# Patient Record
(demographics unavailable — no encounter records)

---

## 2024-12-09 NOTE — ASSESSMENT
[FreeTextEntry1] : replete vitamin D, increase existing supplement by 1000 IU per day  check labs in 4 weeks, once normalized proceed with Reclast continue with duloxetine for pain management   OV 6 months, sooner PRN

## 2024-12-09 NOTE — PHYSICAL EXAM
[General Appearance - Alert] : alert [General Appearance - In No Acute Distress] : in no acute distress [] : no respiratory distress [Auscultation Breath Sounds / Voice Sounds] : lungs were clear to auscultation bilaterally [Abnormal Walk] : normal gait [Nail Clubbing] : no clubbing  or cyanosis of the fingernails [Musculoskeletal - Swelling] : no joint swelling seen [Motor Tone] : muscle strength and tone were normal [Oriented To Time, Place, And Person] : oriented to person, place, and time

## 2024-12-09 NOTE — HISTORY OF PRESENT ILLNESS
[FreeTextEntry1] : s/p pneumonia 3 weeks ago; she was seen in UC and prescribed Abx.  Now still with shortness of breath on exertion.   FMS--pain is under control except when it is colder Osteoporosis--taking calcium and vitamin D;

## 2024-12-17 NOTE — PROCEDURE
[FreeTextEntry1] : CXR: clear lungs, no focal consolidation or pleural effusions, cardiac silhouette appears normal.  No bony abnormality.  Prior exams reviewed:   PFT: Normal spirometry.  Lung volumes normal. DLCO normal.   ACC: [id number] EXAM: CT ANGIO CHEST PULM ART [facility] ACC: [id number] EXAM: CT NECK SOFT TISSUE  PROCEDURE DATE: 12/19/2022    INTERPRETATION: INDICATIONS: Sensation of foreign body in the throat. Swelling. Acute shortness of breath.  COMPARISON: None available.  PROCEDURE: CT soft tissue neck without IV contrast. CT Angiography of the Chest. Sagittal and coronal reformats were performed as well as 3D (MIP) reconstructions.  FINDINGS: NECK:  AERODIGESTIVE TRACT: No evidence of retained radiopaque foreign body. Mild nonspecific fullness of the lingual and palatine tonsils. There is asymmetric thickening/edema in the left posterior pharyngeal wall (series 3 images 102 through 114 of indeterminate etiology. The supraglottic and glottic airway appears symmetric. Subglottic airway is unremarkable. LYMPH NODES: No adenopathy. PAROTID GLANDS: Unremarkable. SUBMANDIBULAR GLANDS: Unremarkable. THYROID GLAND: Unremarkable. VISUALIZED PARANASAL SINUSES: Clear. VISUALIZED TYMPANOMASTOID CAVITIES: Clear. BONES: Mild degenerative changes. MISCELLANEOUS: Visualized intracranial compartment is unremarkable. Incidental partial retropharyngeal course of the right internal carotid artery.  CHEST:  LUNGS AND AIRWAYS: Patent central airways. No suspicious pulmonary nodule. No evidence of pneumonia. Minimal bibasilar atelectasis. PLEURA: No pleural effusion. No pneumothorax. MEDIASTINUM AND ROBERTO: No lymphadenopathy. VESSELS: Adequate opacification of the pulmonary vasculature. No filling defect present to suggest acute pulmonary embolism. The thoracic aorta is normal in caliber. The main pulmonary arteries dilated to 3.1 cm. HEART: Heart size is normal. No pericardial effusion. CHEST WALL AND LOWER NECK: Within normal limits. VISUALIZED UPPER ABDOMEN: Cholecystectomy. BONES: Within normal limits.   IMPRESSION: Neck: No retained radiopaque foreign body. Indeterminate asymmetric left posterior pharyngeal wall thickening/edema. Follow-up with direct visualization would be of benefit. Further investigation with a contrast-enhanced CT or preferably MRI may also be beneficial in further evaluation.  CTPA: No pulmonary embolism. Mildly dilated main pulmonary artery which can be seen in the setting of pulmonary arterial hypertension. No evidence of pneumonia.  --- End of Report ---      Prior exams reviewed:  PFT: Normal spirometry.  Lung volumes normal. DLCO normal.   CXR: clear lungs, no focal consolidation or pleural effusions, cardiac silhouette appears normal.  No bony abnormality.

## 2024-12-17 NOTE — HISTORY OF PRESENT ILLNESS
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

## 2025-02-04 NOTE — HISTORY OF PRESENT ILLNESS
[Never] : never [TextBox_4] : doing well insurance wont cover qvar anymore, needs to switch to arnuity no complaints

## 2025-02-04 NOTE — ASSESSMENT
[FreeTextEntry1] : change qvar to arnuity once daily bc of insurance coverage cont prn albuterol  fu 3 mo  Total encounter time 30 minutes.

## 2025-02-04 NOTE — PROCEDURE
[FreeTextEntry1] : yoshi: normal  Prior exams reviewed:  CXR: clear lungs, no focal consolidation or pleural effusions, cardiac silhouette appears normal.  No bony abnormality.   PFT: Normal spirometry.  Lung volumes normal. DLCO normal.   ACC: [id number] EXAM: CT ANGIO CHEST PULM ART [facility] ACC: [id number] EXAM: CT NECK SOFT TISSUE  PROCEDURE DATE: 12/19/2022    INTERPRETATION: INDICATIONS: Sensation of foreign body in the throat. Swelling. Acute shortness of breath.  COMPARISON: None available.  PROCEDURE: CT soft tissue neck without IV contrast. CT Angiography of the Chest. Sagittal and coronal reformats were performed as well as 3D (MIP) reconstructions.  FINDINGS: NECK:  AERODIGESTIVE TRACT: No evidence of retained radiopaque foreign body. Mild nonspecific fullness of the lingual and palatine tonsils. There is asymmetric thickening/edema in the left posterior pharyngeal wall (series 3 images 102 through 114 of indeterminate etiology. The supraglottic and glottic airway appears symmetric. Subglottic airway is unremarkable. LYMPH NODES: No adenopathy. PAROTID GLANDS: Unremarkable. SUBMANDIBULAR GLANDS: Unremarkable. THYROID GLAND: Unremarkable. VISUALIZED PARANASAL SINUSES: Clear. VISUALIZED TYMPANOMASTOID CAVITIES: Clear. BONES: Mild degenerative changes. MISCELLANEOUS: Visualized intracranial compartment is unremarkable. Incidental partial retropharyngeal course of the right internal carotid artery.  CHEST:  LUNGS AND AIRWAYS: Patent central airways. No suspicious pulmonary nodule. No evidence of pneumonia. Minimal bibasilar atelectasis. PLEURA: No pleural effusion. No pneumothorax. MEDIASTINUM AND ROBERTO: No lymphadenopathy. VESSELS: Adequate opacification of the pulmonary vasculature. No filling defect present to suggest acute pulmonary embolism. The thoracic aorta is normal in caliber. The main pulmonary arteries dilated to 3.1 cm. HEART: Heart size is normal. No pericardial effusion. CHEST WALL AND LOWER NECK: Within normal limits. VISUALIZED UPPER ABDOMEN: Cholecystectomy. BONES: Within normal limits.   IMPRESSION: Neck: No retained radiopaque foreign body. Indeterminate asymmetric left posterior pharyngeal wall thickening/edema. Follow-up with direct visualization would be of benefit. Further investigation with a contrast-enhanced CT or preferably MRI may also be beneficial in further evaluation.  CTPA: No pulmonary embolism. Mildly dilated main pulmonary artery which can be seen in the setting of pulmonary arterial hypertension. No evidence of pneumonia.  --- End of Report ---      Prior exams reviewed:  PFT: Normal spirometry.  Lung volumes normal. DLCO normal.   CXR: clear lungs, no focal consolidation or pleural effusions, cardiac silhouette appears normal.  No bony abnormality.

## 2025-05-06 NOTE — ASSESSMENT
[FreeTextEntry1] : cont arnuity, singulair  prn albuterol will give medrol pack to have in case symptoms worsen  fu 3 mo  Total encounter time 30 minutes.

## 2025-05-06 NOTE — PROCEDURE
[FreeTextEntry1] : yoshi: normal  CXR: clear lungs, no focal consolidation or pleural effusions, cardiac silhouette appears normal.  No bony abnormality.  Prior exams reviewed:  CXR: clear lungs, no focal consolidation or pleural effusions, cardiac silhouette appears normal.  No bony abnormality.   PFT: Normal spirometry.  Lung volumes normal. DLCO normal.   ACC: [id number] [id number] EXAM: CT NECK SOFT TISSUE  PROCEDURE DATE: 12/19/2022    INTERPRETATION: INDICATIONS: Sensation of foreign body in the throat. Swelling. Acute shortness of breath.  COMPARISON: None available.  PROCEDURE: CT soft tissue neck without IV contrast. CT Angiography of the Chest. Sagittal and coronal reformats were performed as well as 3D (MIP) reconstructions.  FINDINGS: NECK:  AERODIGESTIVE TRACT: No evidence of retained radiopaque foreign body. Mild nonspecific fullness of the lingual and palatine tonsils. There is asymmetric thickening/edema in the left posterior pharyngeal wall (series 3 images 102 through 114 of indeterminate etiology. The supraglottic and glottic airway appears symmetric. Subglottic airway is unremarkable. LYMPH NODES: No adenopathy. PAROTID GLANDS: Unremarkable. SUBMANDIBULAR GLANDS: Unremarkable. THYROID GLAND: Unremarkable. VISUALIZED PARANASAL SINUSES: Clear. VISUALIZED TYMPANOMASTOID CAVITIES: Clear. BONES: Mild degenerative changes. MISCELLANEOUS: Visualized intracranial compartment is unremarkable. Incidental partial retropharyngeal course of the right internal carotid artery.  CHEST:  LUNGS AND AIRWAYS: Patent central airways. No suspicious pulmonary nodule. No evidence of pneumonia. Minimal bibasilar atelectasis. PLEURA: No pleural effusion. No pneumothorax. MEDIASTINUM AND ROBERTO: No lymphadenopathy. VESSELS: Adequate opacification of the pulmonary vasculature. No filling defect present to suggest acute pulmonary embolism. The thoracic aorta is normal in caliber. The main pulmonary arteries dilated to 3.1 cm. HEART: Heart size is normal. No pericardial effusion. CHEST WALL AND LOWER NECK: Within normal limits. VISUALIZED UPPER ABDOMEN: Cholecystectomy. BONES: Within normal limits.   IMPRESSION: Neck: No retained radiopaque foreign body. Indeterminate asymmetric left posterior pharyngeal wall thickening/edema. Follow-up with direct visualization would be of benefit. Further investigation with a contrast-enhanced CT or preferably MRI may also be beneficial in further evaluation.  CTPA: No pulmonary embolism. Mildly dilated main pulmonary artery which can be seen in the setting of pulmonary arterial hypertension. No evidence of pneumonia.  --- End of Report ---      Prior exams reviewed:  PFT: Normal spirometry.  Lung volumes normal. DLCO normal.   CXR: clear lungs, no focal consolidation or pleural effusions, cardiac silhouette appears normal.  No bony abnormality.

## 2025-05-06 NOTE — HISTORY OF PRESENT ILLNESS
[Never] : never [TextBox_4] : got sick over the weekend wheeze/sob no fever feeling better today on montelukast, allergy meds, finishing qvar and will start arnuity

## 2025-05-20 NOTE — REVIEW OF SYSTEMS
[Fatigue] : fatigue [Heartburn] : heartburn [Frequency] : frequency [Joint Pain] : joint pain [Muscle Pain] : muscle pain [Memory Loss] : memory loss [Depression] : depression [Negative] : Heme/Lymph [Constipation] : constipation [Diarrhea] : diarrhea [Fever] : no fever [Chills] : no chills [Hot Flashes] : no hot flashes [Night Sweats] : no night sweats [Recent Change In Weight] : ~T no recent weight change [Chest Pain] : no chest pain [Palpitations] : no palpitations [Leg Claudication] : no leg claudication [Lower Ext Edema] : no lower extremity edema [Orthopnea] : no orthopnea [Paroxysmal Nocturnal Dyspnea] : no paroxysmal nocturnal dyspnea [Shortness Of Breath] : no shortness of breath [Wheezing] : no wheezing [Cough] : no cough [Dyspnea on Exertion] : no dyspnea on exertion [Abdominal Pain] : no abdominal pain [Nausea] : no nausea [Vomiting] : no vomiting [Melena] : no melena [Dysuria] : no dysuria [Incontinence] : no incontinence [Nocturia] : no nocturia [Hematuria] : no hematuria [Headache] : no headache [Dizziness] : no dizziness [Fainting] : no fainting [Confusion] : no confusion [Unsteady Walking] : no ataxia [Suicidal] : not suicidal [Insomnia] : no insomnia [Anxiety] : no anxiety [FreeTextEntry3] : floaters [de-identified] : stress

## 2025-05-20 NOTE — HEALTH RISK ASSESSMENT
[No] : In the past 12 months have you used drugs other than those required for medical reasons? No [0] : 2) Feeling down, depressed, or hopeless: Not at all (0) [PHQ-2 Negative - No further assessment needed] : PHQ-2 Negative - No further assessment needed [Never] : Never [Audit-CScore] : 0 [[id number]] : 0

## 2025-05-20 NOTE — REVIEW OF SYSTEMS
[Fatigue] : fatigue [Heartburn] : heartburn [Frequency] : frequency [Joint Pain] : joint pain [Muscle Pain] : muscle pain [Memory Loss] : memory loss [Depression] : depression [Negative] : Heme/Lymph [Constipation] : constipation [Diarrhea] : diarrhea [Fever] : no fever [Chills] : no chills [Hot Flashes] : no hot flashes [Night Sweats] : no night sweats [Recent Change In Weight] : ~T no recent weight change [Chest Pain] : no chest pain [Palpitations] : no palpitations [Leg Claudication] : no leg claudication [Lower Ext Edema] : no lower extremity edema [Orthopnea] : no orthopnea [Paroxysmal Nocturnal Dyspnea] : no paroxysmal nocturnal dyspnea [Shortness Of Breath] : no shortness of breath [Wheezing] : no wheezing [Cough] : no cough [Dyspnea on Exertion] : no dyspnea on exertion [Abdominal Pain] : no abdominal pain [Nausea] : no nausea [Vomiting] : no vomiting [Melena] : no melena [Dysuria] : no dysuria [Incontinence] : no incontinence [Nocturia] : no nocturia [Hematuria] : no hematuria [Headache] : no headache [Dizziness] : no dizziness [Fainting] : no fainting [Confusion] : no confusion [Unsteady Walking] : no ataxia [Suicidal] : not suicidal [Insomnia] : no insomnia [Anxiety] : no anxiety [FreeTextEntry3] : floaters [de-identified] : stress

## 2025-05-20 NOTE — PHYSICAL EXAM
[No Acute Distress] : no acute distress [Well Nourished] : well nourished [Well Developed] : well developed [Well-Appearing] : well-appearing [Normal Voice/Communication] : normal voice/communication [Normal Sclera/Conjunctiva] : normal sclera/conjunctiva [PERRL] : pupils equal round and reactive to light [EOMI] : extraocular movements intact [Normal Outer Ear/Nose] : the outer ears and nose were normal in appearance [Normal Oropharynx] : the oropharynx was normal [No JVD] : no jugular venous distention 6 (moderate pain) [No Lymphadenopathy] : no lymphadenopathy [Supple] : supple [Thyroid Normal, No Nodules] : the thyroid was normal and there were no nodules present [No Respiratory Distress] : no respiratory distress  [No Accessory Muscle Use] : no accessory muscle use [Clear to Auscultation] : lungs were clear to auscultation bilaterally [Normal Rate] : normal rate  [Regular Rhythm] : with a regular rhythm [Normal S1, S2] : normal S1 and S2 [No Murmur] : no murmur heard [No Carotid Bruits] : no carotid bruits [No Abdominal Bruit] : a ~M bruit was not heard ~T in the abdomen [Pedal Pulses Present] : the pedal pulses are present [No Edema] : there was no peripheral edema [No Palpable Aorta] : no palpable aorta [Soft] : abdomen soft [Non Tender] : non-tender [Non-distended] : non-distended [No Masses] : no abdominal mass palpated [No HSM] : no HSM [Normal Bowel Sounds] : normal bowel sounds [Normal Posterior Cervical Nodes] : no posterior cervical lymphadenopathy [Normal Anterior Cervical Nodes] : no anterior cervical lymphadenopathy [No CVA Tenderness] : no CVA  tenderness [No Spinal Tenderness] : no spinal tenderness [No Joint Swelling] : no joint swelling [Grossly Normal Strength/Tone] : grossly normal strength/tone [No Rash] : no rash [Coordination Grossly Intact] : coordination grossly intact [No Focal Deficits] : no focal deficits [Normal Gait] : normal gait [Speech Grossly Normal] : speech grossly normal [Memory Grossly Normal] : memory grossly normal [Normal Affect] : the affect was normal [Alert and Oriented x3] : oriented to person, place, and time [Normal Mood] : the mood was normal [Normal Insight/Judgement] : insight and judgment were intact [de-identified] : + tenderness at the rt lower posterolateral ribs

## 2025-05-20 NOTE — PHYSICAL EXAM
[No Acute Distress] : no acute distress [Well Nourished] : well nourished [Well Developed] : well developed [Well-Appearing] : well-appearing [Normal Voice/Communication] : normal voice/communication [Normal Sclera/Conjunctiva] : normal sclera/conjunctiva [PERRL] : pupils equal round and reactive to light [EOMI] : extraocular movements intact [Normal Outer Ear/Nose] : the outer ears and nose were normal in appearance [Normal Oropharynx] : the oropharynx was normal [No JVD] : no jugular venous distention [No Lymphadenopathy] : no lymphadenopathy [Supple] : supple [Thyroid Normal, No Nodules] : the thyroid was normal and there were no nodules present [No Respiratory Distress] : no respiratory distress  [No Accessory Muscle Use] : no accessory muscle use [Clear to Auscultation] : lungs were clear to auscultation bilaterally [Normal Rate] : normal rate  [Regular Rhythm] : with a regular rhythm [Normal S1, S2] : normal S1 and S2 [No Murmur] : no murmur heard [No Carotid Bruits] : no carotid bruits [No Abdominal Bruit] : a ~M bruit was not heard ~T in the abdomen [Pedal Pulses Present] : the pedal pulses are present [No Edema] : there was no peripheral edema [No Palpable Aorta] : no palpable aorta [Soft] : abdomen soft [Non Tender] : non-tender [Non-distended] : non-distended [No Masses] : no abdominal mass palpated [No HSM] : no HSM [Normal Bowel Sounds] : normal bowel sounds [Normal Posterior Cervical Nodes] : no posterior cervical lymphadenopathy [Normal Anterior Cervical Nodes] : no anterior cervical lymphadenopathy [No CVA Tenderness] : no CVA  tenderness [No Spinal Tenderness] : no spinal tenderness [No Joint Swelling] : no joint swelling [Grossly Normal Strength/Tone] : grossly normal strength/tone [No Rash] : no rash [Coordination Grossly Intact] : coordination grossly intact [No Focal Deficits] : no focal deficits [Normal Gait] : normal gait [Speech Grossly Normal] : speech grossly normal [Memory Grossly Normal] : memory grossly normal [Normal Affect] : the affect was normal [Alert and Oriented x3] : oriented to person, place, and time [Normal Mood] : the mood was normal [Normal Insight/Judgement] : insight and judgment were intact [de-identified] : + tenderness at the rt lower posterolateral ribs

## 2025-05-20 NOTE — HEALTH RISK ASSESSMENT
[No] : In the past 12 months have you used drugs other than those required for medical reasons? No [0] : 2) Feeling down, depressed, or hopeless: Not at all (0) [PHQ-2 Negative - No further assessment needed] : PHQ-2 Negative - No further assessment needed [Never] : Never [Audit-CScore] : 0 [[id number]] : 0

## 2025-05-20 NOTE — HISTORY OF PRESENT ILLNESS
[FreeTextEntry1] : htn, hld, asthma, gerd, h pylori, op, arthritis, myalgias, vit D [de-identified] : 61 y/o female with a hx htn, asthma, gerd, h pylori infection, op, joint aches/djd/myalgias, low vit D here for f/u  s/p 2 episodes of ? herpetic infection.  Was seen at urgent care.  Has seen derm.  Has been improved.   PATT positive. inflammatory arthritis w/u neg, f/u Patt neg.  Pt has followed up with rheumatology. ? psudodementia from depression. Has been getting continued w/u from neurology. Sx have been stable. Waiting to have the neuropsych testing set up.  Has been lost to f/u. reported as stable.  not worsening.   Hepatology suspicious for poss PBC. labs neg except for the elevated Alk Phos. Has followed up with rheumatology and pulmonology since she was last seen.  notes reviewed.  Recent lab results reviewed. s/p reclast  Has been taking rx for the asthma. Stable.  Pulm needed to change maint inhaler due to insurance coverage. s/p 2 rounds of rx for the h pylori, on PPI. Saw ENT - suspect GERD as etiology of the upper resp/throat sx.  Has not been taking GERD rx.  Has been having sx with inc coffee consumption.  Had tried trigger avoidance. Has been better with trigger avoidance and famotidine. Reports that she needs to re-establish with Gi  Has been taking IBGaurd for bloating, constipation/diarrhea.     She saw cardiology for the dilated pulm artery on CT. Started on statin. Carotid duplex neg. Stress was ordered. Overdue for f/u has been taking vit D and Ca. Walking for exercise. with hx GI effects from bisphosphonate. Rx was on hold b/c of dental work. Has been following with Rheum as noted - had f/u dexa for baseline for restarting rx.  Received reclast.  Overdue for f/u. Shoulders improved with PT. Had seen urology for ? Pelvic floor dysfunction. Has been taking rx for the BP. diet - needs improvement. Reports hx depression in the past - has been taking the duloxetine. Mood has been mostly controlled.  Has some anx when she is trying to go to sleep.  Has been intermittent,  Reports that the mood has been okay. + occ headaches.  Rt sided, squeezing.  Has been occasional. followed up with ophtho a couple of months ago.  s/p hemicolectomy in the past. Reports some memory issues - getting worked up and followed with neurology. Sx have been continued.  Not worsening.    gyn - 7/23 mammo/sono 4-5/23 - due dexa 7/23 colon - referred to re-establish with Gi.  covid - to get second booster Prevnar 20 - 8/24

## 2025-05-20 NOTE — HISTORY OF PRESENT ILLNESS
[FreeTextEntry1] : htn, hld, asthma, gerd, h pylori, op, arthritis, myalgias, vit D [de-identified] : 61 y/o female with a hx htn, asthma, gerd, h pylori infection, op, joint aches/djd/myalgias, low vit D here for f/u  s/p 2 episodes of ? herpetic infection.  Was seen at urgent care.  Has seen derm.  Has been improved.   PATT positive. inflammatory arthritis w/u neg, f/u Patt neg.  Pt has followed up with rheumatology. ? psudodementia from depression. Has been getting continued w/u from neurology. Sx have been stable. Waiting to have the neuropsych testing set up.  Has been lost to f/u. reported as stable.  not worsening.   Hepatology suspicious for poss PBC. labs neg except for the elevated Alk Phos. Has followed up with rheumatology and pulmonology since she was last seen.  notes reviewed.  Recent lab results reviewed. s/p reclast  Has been taking rx for the asthma. Stable.  Pulm needed to change maint inhaler due to insurance coverage. s/p 2 rounds of rx for the h pylori, on PPI. Saw ENT - suspect GERD as etiology of the upper resp/throat sx.  Has not been taking GERD rx.  Has been having sx with inc coffee consumption.  Had tried trigger avoidance. Has been better with trigger avoidance and famotidine. Reports that she needs to re-establish with Gi  Has been taking IBGaurd for bloating, constipation/diarrhea.     She saw cardiology for the dilated pulm artery on CT. Started on statin. Carotid duplex neg. Stress was ordered. Overdue for f/u has been taking vit D and Ca. Walking for exercise. with hx GI effects from bisphosphonate. Rx was on hold b/c of dental work. Has been following with Rheum as noted - had f/u dexa for baseline for restarting rx.  Received reclast.  Overdue for f/u. Shoulders improved with PT. Had seen urology for ? Pelvic floor dysfunction. Has been taking rx for the BP. diet - needs improvement. Reports hx depression in the past - has been taking the duloxetine. Mood has been mostly controlled.  Has some anx when she is trying to go to sleep.  Has been intermittent,  Reports that the mood has been okay. + occ headaches.  Rt sided, squeezing.  Has been occasional. followed up with ophtho a couple of months ago.  s/p hemicolectomy in the past. Reports some memory issues - getting worked up and followed with neurology. Sx have been continued.  Not worsening.    gyn - 7/23 mammo/sono 4-5/23 - due dexa 7/23 colon - referred to re-establish with Gi.  covid - to get second booster Prevnar 20 - 8/24

## 2025-05-27 NOTE — HISTORY OF PRESENT ILLNESS
[FreeTextEntry1] :  FMS--pain is under control overall  Depression--well controlled with duloxetine Osteoporosis--taking calcium and vitamin D, repeat Vitamin D wnl; DEXA pending 7/2025  Pt reports right sided rib pain for the past 3 weeks.  She went to ER last Sunday as the pain was very severe as the pain was very severe when she was taking deep breath CT A/P and chest x-rays reviewed in HIE; both are unrevealing.  seen by PCP and was told to take Tylenol as needed for pain  Also has back pain and hip pain b/l.  Pain is persistent and is present at all times, irrespective of food intake

## 2025-05-27 NOTE — ASSESSMENT
[FreeTextEntry1] : check rib series to rule out fracture, given history of osteoporosis check US of the liver to rule out liver pathology  continue with duloxetine for chronic FMS pain  DEXA pending in 7/2025 repeat reclast if above work up is negative  OV 2 months, sooner PRN   My collective time spent on today's visit was greater than 30 minutes and included: Preparation for the visit, review of the medical records, review of pertinent diagnostic studies, examination and counseling of the patient on the above diagnosis, treatment plan and prognosis, orders of diagnostic test, medications and or appropriate procedures and documentation in the medical record of today's visit.

## 2025-05-27 NOTE — PHYSICAL EXAM
[General Appearance - Alert] : alert [General Appearance - In No Acute Distress] : in no acute distress [Full Pulse] : the pedal pulses are present [Edema] : there was no peripheral edema [Oriented To Time, Place, And Person] : oriented to person, place, and time [Impaired Insight] : insight and judgment were intact [Affect] : the affect was normal [FreeTextEntry1] : TTP RUQ, especially along lower ribs

## 2025-06-17 NOTE — HISTORY OF PRESENT ILLNESS
[de-identified] : 62F w/ hx of open cholecystectomy (30+ yrs ago [facility]), , R hemicolectomy, ovarian cystectomy, presents w/ incidentally found umbilical hernia on CT obtained during her recent visit to [facility] ED. Pt states she's been having RUQ/R flank pain since the beginning of May, not improved by pain meds, not worsened by anything, and went to ED. CT obtained at the time did not show anything other than small R lobe hepatic cyst and small umbilical hernia.    AVSS Gen: NAD Resp: nonlabored GI: soft, ND, no guarding, mild RUQ tenderness, Kocher incision scar, midline periumbilical vertical scar, small umbilical hernia reducible nontender  62F w/ multiple surgical hx, presenting for eval of incidentally found umbilical hernia - discussed that no intervention is needed for umbilical hernia. 6mo follow up for close monitoring - recommended proceeding w/ Liver US and xray to better evaluate for rib fx